# Patient Record
Sex: MALE | HISPANIC OR LATINO | Employment: OTHER | URBAN - METROPOLITAN AREA
[De-identification: names, ages, dates, MRNs, and addresses within clinical notes are randomized per-mention and may not be internally consistent; named-entity substitution may affect disease eponyms.]

---

## 2022-10-24 ENCOUNTER — OFFICE VISIT (OUTPATIENT)
Dept: PODIATRY | Facility: CLINIC | Age: 32
End: 2022-10-24
Payer: COMMERCIAL

## 2022-10-24 ENCOUNTER — HOSPITAL ENCOUNTER (OUTPATIENT)
Dept: RADIOLOGY | Facility: HOSPITAL | Age: 32
Discharge: HOME | End: 2022-10-24
Attending: PODIATRIST
Payer: COMMERCIAL

## 2022-10-24 VITALS
OXYGEN SATURATION: 98 % | HEART RATE: 77 BPM | DIASTOLIC BLOOD PRESSURE: 76 MMHG | BODY MASS INDEX: 31.61 KG/M2 | HEIGHT: 68 IN | SYSTOLIC BLOOD PRESSURE: 120 MMHG | WEIGHT: 208.6 LBS

## 2022-10-24 DIAGNOSIS — M67.471 GANGLION CYST OF RIGHT FOOT: Primary | ICD-10-CM

## 2022-10-24 DIAGNOSIS — M21.42 PES PLANUS OF BOTH FEET: ICD-10-CM

## 2022-10-24 DIAGNOSIS — M79.672 PAIN IN LEFT FOOT: ICD-10-CM

## 2022-10-24 DIAGNOSIS — M79.671 PAIN IN RIGHT FOOT: ICD-10-CM

## 2022-10-24 DIAGNOSIS — M21.41 PES PLANUS OF BOTH FEET: ICD-10-CM

## 2022-10-24 PROCEDURE — 99243 OFF/OP CNSLTJ NEW/EST LOW 30: CPT | Performed by: PODIATRIST

## 2022-10-24 PROCEDURE — 73630 X-RAY EXAM OF FOOT: CPT

## 2022-10-24 NOTE — LETTER
October 25, 2022     Wilma Cates DO  [address]    Patient: Ervin Zuleta   YOB: 1990   Date of Visit: 10/24/2022       Dear Dr Farnaz Ace: Thank you for referring Ervin Zuleta to me for evaluation  Below are my notes for this consultation  If you have questions, please do not hesitate to call me  I look forward to following your patient along with you  Sincerely,        Ailyn Hernandez DPM        CC: No Recipients  Nellie Martin  10/24/2022  7:42 PM  Signed  Assessment/Plan:    The patient is x-rays of both feet taken today were personally reviewed by myself and with the patient  There are no fractures or dislocations identified to either foot  Will follow up on official read  The patient's clinical examination is consistent with a resolved ganglion cyst to the dorsal aspect the his right midfoot  He also has a pes planovalgus deformity on the left which causes impingement of the lateral subtalar joint sinus tarsi and mild posterior tibial tendinitis on the left  Some stretching exercises and strengthening exercises were included to his aftercare summary  I did recommend [employer] [place] arch supports for his work shoes  He does follow with a chiropractor and has discuss custom orthotics with him in the past   We can also consider formal course of physical therapy though the patient does prefer of exercises that he can do at home 1st     He will follow-up with me in a as needed basis  Diagnoses and all orders for this visit:    Ganglion cyst of right foot    Pain in right foot  -     XR foot 3+ vw right; Future    Pain in left foot  -     XR foot 3+ vw left; Future    Pes planus of both feet          Subjective:      Patient ID: Ervin Zuleta is a 29 y o  male      The patient presents today with a chief complaint of a tender mass to the dorsal aspect of his right midfoot that been present after wearing a pair of new sneakers that were somewhat tight  He states the size of the lesion would have been flow, but states that did eventually go away  Today there is no tenderness and there is no mass to the dorsum of his right foot  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  He cannot recall any injury or trauma to either foot  Pain is relieved by getting off of his feet  The following portions of the patient's history were reviewed and updated as appropriate: allergies, current medications, past family history, past medical history, past social history, past surgical history and problem list       PAST MEDICAL HISTORY:  History reviewed  No pertinent past medical history  PAST SURGICAL HISTORY:  History reviewed  No pertinent surgical history  ALLERGIES:  Patient has no known allergies  MEDICATIONS:  No current outpatient medications on file  No current facility-administered medications for this visit  SOCIAL HISTORY:  Social History     Socioeconomic History   • Marital status: /Civil Union     Spouse name: None   • Number of children: None   • Years of education: None   • Highest education level: None   Occupational History   • None   Tobacco Use   • Smoking status: Never Smoker   • Smokeless tobacco: Never Used   Substance and Sexual Activity   • Alcohol use: Never   • Drug use: Never   • Sexual activity: None   Other Topics Concern   • None   Social History Narrative   • None     Social Determinants of Health     Financial Resource Strain: Not on file   Food Insecurity: Not on file   Transportation Needs: Not on file   Physical Activity: Not on file   Stress: Not on file   Social Connections: Not on file   Intimate Partner Violence: Not on file   Housing Stability: Not on file        Review of Systems   Constitutional: Negative for chills and fever  HENT: Negative for ear pain and sore throat      Eyes: Negative for pain and visual disturbance  Respiratory: Negative for cough and shortness of breath  Cardiovascular: Negative for chest pain and palpitations  Gastrointestinal: Negative for abdominal pain and vomiting  Genitourinary: Negative for dysuria and hematuria  Musculoskeletal: Negative for arthralgias and back pain  Skin: Negative for color change and rash  Neurological: Negative for seizures and syncope  Psychiatric/Behavioral: Negative  All other systems reviewed and are negative  Objective:      /76   Pulse 77   Ht 5' 8" (1 727 m)   Wt 94 6 kg (208 lb 9 6 oz)   SpO2 98%   BMI 31 72 kg/m²          Physical Exam  Vitals reviewed  Constitutional:       Appearance: Normal appearance  HENT:      Head: Normocephalic and atraumatic  Nose: Nose normal    Eyes:      Conjunctiva/sclera: Conjunctivae normal       Pupils: Pupils are equal, round, and reactive to light  Cardiovascular:      Pulses:           Dorsalis pedis pulses are 2+ on the right side and 2+ on the left side  Posterior tibial pulses are 2+ on the right side and 2+ on the left side  Pulmonary:      Effort: Pulmonary effort is normal    Feet:      Comments: On clinical examination today, there is no observable or palpable soft tissue mass in the dorsum of the patient's right midfoot; there is no tenderness to palpation to the area where the lesion was noted; there is no erythema nor ecchymosis no edema nor calor    The patient has a pes planus foot type, that is markedly worse on the left and is on the right  There is some tenderness along the lateral aspect the subtalar joint sinus tarsi consistent with an impingement syndrome; the patient notes some mild tenderness and weakness along the posterior tibial tendon  Skin:     General: Skin is warm  Capillary Refill: Capillary refill takes less than 2 seconds  Neurological:      General: No focal deficit present        Mental Status: He is alert and oriented to person, place, and time  Psychiatric:         Mood and Affect: Mood normal          Behavior: Behavior normal          Thought Content:  Thought content normal

## 2022-10-24 NOTE — PROGRESS NOTES
Assessment/Plan:    The patient is x-rays of both feet taken today were personally reviewed by myself and with the patient  There are no fractures or dislocations identified to either foot  Will follow up on official read  The patient's clinical examination is consistent with a resolved ganglion cyst to the dorsal aspect the his right midfoot  He also has a pes planovalgus deformity on the left which causes impingement of the lateral subtalar joint sinus tarsi and mild posterior tibial tendinitis on the left  Some stretching exercises and strengthening exercises were included to his aftercare summary  I did recommend [employer] [place] arch supports for his work shoes  He does follow with a chiropractor and has discuss custom orthotics with him in the past   We can also consider formal course of physical therapy though the patient does prefer of exercises that he can do at home 1st     He will follow-up with me in a as needed basis  Diagnoses and all orders for this visit:    Ganglion cyst of right foot    Pain in right foot  -     XR foot 3+ vw right; Future    Pain in left foot  -     XR foot 3+ vw left; Future    Pes planus of both feet          Subjective:      Patient ID: Gwenevere Abdulkadir is a 29 y o  male  The patient presents today with a chief complaint of a tender mass to the dorsal aspect of his right midfoot that been present after wearing a pair of new sneakers that were somewhat tight  He states the size of the lesion would have been flow, but states that did eventually go away  Today there is no tenderness and there is no mass to the dorsum of his right foot  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  He cannot recall any injury or trauma to either foot  Pain is relieved by getting off of his feet        The following portions of the patient's history were reviewed and updated as appropriate: allergies, current medications, past family history, past medical history, past social history, past surgical history and problem list       PAST MEDICAL HISTORY:  History reviewed  No pertinent past medical history  PAST SURGICAL HISTORY:  History reviewed  No pertinent surgical history  ALLERGIES:  Patient has no known allergies  MEDICATIONS:  No current outpatient medications on file  No current facility-administered medications for this visit  SOCIAL HISTORY:  Social History     Socioeconomic History   • Marital status: /Civil Union     Spouse name: None   • Number of children: None   • Years of education: None   • Highest education level: None   Occupational History   • None   Tobacco Use   • Smoking status: Never Smoker   • Smokeless tobacco: Never Used   Substance and Sexual Activity   • Alcohol use: Never   • Drug use: Never   • Sexual activity: None   Other Topics Concern   • None   Social History Narrative   • None     Social Determinants of Health     Financial Resource Strain: Not on file   Food Insecurity: Not on file   Transportation Needs: Not on file   Physical Activity: Not on file   Stress: Not on file   Social Connections: Not on file   Intimate Partner Violence: Not on file   Housing Stability: Not on file        Review of Systems   Constitutional: Negative for chills and fever  HENT: Negative for ear pain and sore throat  Eyes: Negative for pain and visual disturbance  Respiratory: Negative for cough and shortness of breath  Cardiovascular: Negative for chest pain and palpitations  Gastrointestinal: Negative for abdominal pain and vomiting  Genitourinary: Negative for dysuria and hematuria  Musculoskeletal: Negative for arthralgias and back pain  Skin: Negative for color change and rash  Neurological: Negative for seizures and syncope  Psychiatric/Behavioral: Negative  All other systems reviewed and are negative          Objective:      /76   Pulse 77   Ht 5' 8" (1 727 m) Wt 94 6 kg (208 lb 9 6 oz)   SpO2 98%   BMI 31 72 kg/m²          Physical Exam  Vitals reviewed  Constitutional:       Appearance: Normal appearance  HENT:      Head: Normocephalic and atraumatic  Nose: Nose normal    Eyes:      Conjunctiva/sclera: Conjunctivae normal       Pupils: Pupils are equal, round, and reactive to light  Cardiovascular:      Pulses:           Dorsalis pedis pulses are 2+ on the right side and 2+ on the left side  Posterior tibial pulses are 2+ on the right side and 2+ on the left side  Pulmonary:      Effort: Pulmonary effort is normal    Feet:      Comments: On clinical examination today, there is no observable or palpable soft tissue mass in the dorsum of the patient's right midfoot; there is no tenderness to palpation to the area where the lesion was noted; there is no erythema nor ecchymosis no edema nor calor    The patient has a pes planus foot type, that is markedly worse on the left and is on the right  There is some tenderness along the lateral aspect the subtalar joint sinus tarsi consistent with an impingement syndrome; the patient notes some mild tenderness and weakness along the posterior tibial tendon  Skin:     General: Skin is warm  Capillary Refill: Capillary refill takes less than 2 seconds  Neurological:      General: No focal deficit present  Mental Status: He is alert and oriented to person, place, and time  Psychiatric:         Mood and Affect: Mood normal          Behavior: Behavior normal          Thought Content:  Thought content normal

## 2024-11-23 ENCOUNTER — HOSPITAL ENCOUNTER (EMERGENCY)
Facility: HOSPITAL | Age: 34
Discharge: HOME | End: 2024-11-23
Attending: EMERGENCY MEDICINE
Payer: COMMERCIAL

## 2024-11-23 ENCOUNTER — APPOINTMENT (EMERGENCY)
Dept: RADIOLOGY | Facility: HOSPITAL | Age: 34
End: 2024-11-23
Payer: COMMERCIAL

## 2024-11-23 VITALS
TEMPERATURE: 98.2 F | OXYGEN SATURATION: 97 % | DIASTOLIC BLOOD PRESSURE: 71 MMHG | HEIGHT: 67 IN | RESPIRATION RATE: 16 BRPM | BODY MASS INDEX: 30.59 KG/M2 | SYSTOLIC BLOOD PRESSURE: 145 MMHG | WEIGHT: 194.89 LBS | HEART RATE: 97 BPM

## 2024-11-23 DIAGNOSIS — B34.9 VIRAL SYNDROME: Primary | ICD-10-CM

## 2024-11-23 DIAGNOSIS — R68.89 FLU-LIKE SYMPTOMS: ICD-10-CM

## 2024-11-23 LAB
FLUAV AG UPPER RESP QL IA.RAPID: NEGATIVE
FLUBV AG UPPER RESP QL IA.RAPID: NEGATIVE
SARS-COV+SARS-COV-2 AG RESP QL IA.RAPID: NEGATIVE

## 2024-11-23 PROCEDURE — 87811 SARS-COV-2 COVID19 W/OPTIC: CPT

## 2024-11-23 PROCEDURE — 87804 INFLUENZA ASSAY W/OPTIC: CPT

## 2024-11-23 PROCEDURE — 71046 X-RAY EXAM CHEST 2 VIEWS: CPT

## 2024-11-23 PROCEDURE — 99284 EMERGENCY DEPT VISIT MOD MDM: CPT

## 2024-11-23 PROCEDURE — 96372 THER/PROPH/DIAG INJ SC/IM: CPT

## 2024-11-23 RX ORDER — KETOROLAC TROMETHAMINE 30 MG/ML
15 INJECTION, SOLUTION INTRAMUSCULAR; INTRAVENOUS ONCE
Status: COMPLETED | OUTPATIENT
Start: 2024-11-23 | End: 2024-11-23

## 2024-11-23 RX ORDER — ACETAMINOPHEN 325 MG/1
650 TABLET ORAL ONCE
Status: COMPLETED | OUTPATIENT
Start: 2024-11-23 | End: 2024-11-23

## 2024-11-23 RX ADMIN — ACETAMINOPHEN 650 MG: 325 TABLET, FILM COATED ORAL at 21:44

## 2024-11-23 RX ADMIN — TOPICAL ANESTHETIC 2 SPRAY: 200 SPRAY DENTAL; PERIODONTAL at 21:47

## 2024-11-23 RX ADMIN — KETOROLAC TROMETHAMINE 15 MG: 30 INJECTION, SOLUTION INTRAMUSCULAR at 21:44

## 2024-11-23 NOTE — Clinical Note
Pablo Merrillpatriciaamadeocorinne was seen and treated in our emergency department on 11/23/2024.                Diagnosis:     Pablo  is off the rest of the shift today, may return to work on return date.    He may return on this date: 11/25/2024         If you have any questions or concerns, please don't hesitate to call.      Beth Burch PA-C    ______________________________           _______________          _______________  Hospital Representative                              Date                                Time

## 2024-11-24 NOTE — DISCHARGE INSTRUCTIONS
Tylenol and Motrin.   We will call you for positive results.     Follow-up with your PCP and return to the ER for any worsening symptoms.

## 2024-11-24 NOTE — ED PROVIDER NOTES
Time reflects when diagnosis was documented in both MDM as applicable and the Disposition within this note       Time User Action Codes Description Comment    11/23/2024  9:47 PM Beth Burch [B34.9] Viral syndrome     11/23/2024  9:47 PM Beth Burch [R68.89] Flu-like symptoms           ED Disposition       ED Disposition   Discharge    Condition   Stable    Date/Time   Sat Nov 23, 2024  9:47 PM    Comment   Pablo Isma discharge to home/self care.                   Assessment & Plan       Medical Decision Making  Vital signs stable and patient well-appearing throughout ER course.  No acute CXR findings.  Negative flu, COVID swab.  Patient given medication for symptoms in the ER with improvement on reevaluation.  Symptoms secondary to viral etiology.  Provided patient education and discussed return precautions.  Patient to follow-up with his PCP and return to the ER for any worsening symptoms.  Patient verbalizes understanding and agrees to discharge plan.  Patient also provided with written discharge instructions.    Amount and/or Complexity of Data Reviewed  Labs: ordered.  Radiology: ordered and independent interpretation performed.    Risk  OTC drugs.  Prescription drug management.             Medications   acetaminophen (TYLENOL) tablet 650 mg (650 mg Oral Given 11/23/24 2144)   ketorolac (TORADOL) injection 15 mg (15 mg Intramuscular Given 11/23/24 2144)   benzocaine (HURRICAINE) 20 % mucosal spray 2 spray (2 sprays Mucosal Given 11/23/24 2147)       ED Risk Strat Scores                           SBIRT 20yo+      Flowsheet Row Most Recent Value   Initial Alcohol Screen: US AUDIT-C     1. How often do you have a drink containing alcohol? 0 Filed at: 11/23/2024 2049   2. How many drinks containing alcohol do you have on a typical day you are drinking?  0 Filed at: 11/23/2024 2049   3a. Male UNDER 65: How often do you have five or more drinks on one occasion? 0 Filed at: 11/23/2024 2049  "  Audit-C Score 0 Filed at: 11/23/2024 2049   RASHMI: How many times in the past year have you...    Used an illegal drug or used a prescription medication for non-medical reasons? Never Filed at: 11/23/2024 2049                            History of Present Illness       Chief Complaint   Patient presents with    Flu Symptoms     Pt c/o \"cough, congestion, sore throat, R earache since wednesday\"       History reviewed. No pertinent past medical history.   History reviewed. No pertinent surgical history.   History reviewed. No pertinent family history.   Social History     Tobacco Use    Smoking status: Never    Smokeless tobacco: Never   Substance Use Topics    Alcohol use: Never    Drug use: Never      E-Cigarette/Vaping      E-Cigarette/Vaping Substances      I have reviewed and agree with the history as documented.     Patient is a 34-year-old male who presents to the emergency room for flulike symptoms.  Patient reports symptoms started on Wednesday, x3 days of symptoms.  Reports subjective fevers, chills, fatigue, myalgias, sore throat, productive cough.  Denies any other symptoms.  Patient reports sick contacts.      Flu Symptoms  Presenting symptoms: cough, fatigue, fever, myalgias and sore throat    Presenting symptoms: no headaches, no nausea, no shortness of breath and no vomiting    Associated symptoms: chills    Associated symptoms: no ear pain        Review of Systems   Constitutional:  Positive for chills, fatigue and fever.   HENT:  Positive for sore throat. Negative for ear pain, trouble swallowing and voice change.    Eyes:  Negative for pain and visual disturbance.   Respiratory:  Positive for cough. Negative for shortness of breath.    Cardiovascular:  Negative for chest pain and palpitations.   Gastrointestinal:  Negative for abdominal pain, nausea and vomiting.   Genitourinary:  Negative for dysuria, flank pain and hematuria.   Musculoskeletal:  Positive for myalgias. Negative for arthralgias and " back pain.   Skin:  Negative for color change and rash.   Neurological:  Negative for seizures, syncope and headaches.   Psychiatric/Behavioral:  Negative for confusion.    All other systems reviewed and are negative.          Objective       ED Triage Vitals   Temperature Pulse Blood Pressure Respirations SpO2 Patient Position - Orthostatic VS   11/23/24 2047 11/23/24 2047 11/23/24 2047 11/23/24 2047 11/23/24 2047 11/23/24 2047   98.2 °F (36.8 °C) 97 145/71 16 97 % Sitting      Temp src Heart Rate Source BP Location FiO2 (%) Pain Score    -- 11/23/24 2047 11/23/24 2047 -- 11/23/24 2144     Monitor Left arm  1      Vitals      Date and Time Temp Pulse SpO2 Resp BP Pain Score FACES Pain Rating User   11/23/24 2144 -- -- -- -- -- 1 -- JK   11/23/24 2047 98.2 °F (36.8 °C) 97 97 % 16 145/71 -- -- JT            Physical Exam  Vitals and nursing note reviewed.   Constitutional:       General: He is not in acute distress.     Appearance: Normal appearance. He is well-developed. He is not ill-appearing or diaphoretic.   HENT:      Head: Normocephalic and atraumatic.      Right Ear: Tympanic membrane, ear canal and external ear normal.      Left Ear: Tympanic membrane, ear canal and external ear normal.      Nose: Nose normal.      Mouth/Throat:      Mouth: Mucous membranes are moist.      Pharynx: Oropharynx is clear. No posterior oropharyngeal erythema.      Comments: Airway patent.  Patient tolerating oral secretions and speaking in full sentences without difficulty.  Eyes:      Conjunctiva/sclera: Conjunctivae normal.   Cardiovascular:      Rate and Rhythm: Normal rate and regular rhythm.      Pulses: Normal pulses.      Heart sounds: No murmur heard.  Pulmonary:      Effort: Pulmonary effort is normal. No respiratory distress.      Breath sounds: Normal breath sounds.   Abdominal:      Palpations: Abdomen is soft.      Tenderness: There is no abdominal tenderness.   Musculoskeletal:         General: No swelling.       Cervical back: Neck supple.   Skin:     General: Skin is warm and dry.      Capillary Refill: Capillary refill takes less than 2 seconds.   Neurological:      General: No focal deficit present.      Mental Status: He is alert and oriented to person, place, and time.   Psychiatric:         Mood and Affect: Mood normal.         Results Reviewed       Procedure Component Value Units Date/Time    FLU/COVID Rapid Antigen (30 min. TAT) - Preferred screening test in ED [[id number]]  (Normal) Collected: 11/23/24 2143    Lab Status: Final result Specimen: Nares from Nose Updated: 11/23/24 2211     SARS COV Rapid Antigen Negative     Influenza A Rapid Antigen Negative     Influenza B Rapid Antigen Negative    Narrative:      This test has been performed using the [employer] Bushra 2 FLU+SARS Antigen test under the Emergency Use Authorization (EUA). This test has been validated by the  and verified by the performing laboratory. The Bushra uses lateral flow immunofluorescent sandwich assay to detect SARS-COV, Influenza A and Influenza B Antigen.     The [employer] Bushra 2 SARS Antigen test does not differentiate between SARS-CoV and SARS-CoV-2.     Negative results are presumptive and may be confirmed with a molecular assay, if necessary, for patient management. Negative results do not rule out SARS-CoV-2 or influenza infection and should not be used as the sole basis for treatment or patient management decisions. A negative test result may occur if the level of antigen in a sample is below the limit of detection of this test.     Positive results are indicative of the presence of viral antigens, but do not rule out bacterial infection or co-infection with other viruses.     All test results should be used as an adjunct to clinical observations and other information available to the provider.    FOR PEDIATRIC PATIENTS - copy/paste COVID Guidelines URL to browser:  [email or web address]            XR chest 2 views   ED Interpretation by Beth Burch PA-C (11/23 2113)   No acute cardiopulmonary abnormality      Final Interpretation by Raffaele Pham MD (11/24 0046)      No acute cardiopulmonary disease.            Workstation performed: [id number]             Procedures    ED Medication and Procedure Management   None     There are no discharge medications for this patient.    No discharge procedures on file.  ED SEPSIS DOCUMENTATION   Time reflects when diagnosis was documented in both MDM as applicable and the Disposition within this note       Time User Action Codes Description Comment    11/23/2024  9:47 PM Beth Burch [B34.9] Viral syndrome     11/23/2024  9:47 PM Beth Burch [R68.89] Flu-like symptoms                  Beth Burch PA-C  11/24/24 3341